# Patient Record
Sex: MALE | Race: BLACK OR AFRICAN AMERICAN
[De-identification: names, ages, dates, MRNs, and addresses within clinical notes are randomized per-mention and may not be internally consistent; named-entity substitution may affect disease eponyms.]

---

## 2017-06-17 NOTE — PHYS DOC
Past Medical History


Past Medical History:  Diabetes-Type I, Hypertension


Additional Past Medical Histor:  Pancreatitis, and Pancreatic Cancer


Past Surgical History:  Other


Additional Past Surgical Histo:  PANCREATECTOMY


Alcohol Use:  None


Drug Use:  None





Adult General


Chief Complaint


Chief Complaint:  BLOOD SUGAR PROBLEM





[facility]


HPI





Patient is a 72  year old male who presents with low blood sugar.  Pt ate fish 

and steamed vegetables tonight then took dose of Regular Insulin 8 units at 

approx 730pm  Then approx 30 min later pt was witnessed by wife to be very 

sweaty then confused and aggitated.  Called EMS on their arrival Accucheck was 

21.  Pt combative and they were unable to get IV access.  An IO was placed in L 

tibia and then gave glucagon and 1/2 amp D50.  Pt mentation and BS improved and 

transported to ER.  Pt denies any pain except at site where IO was placed.  Pt 

states when eats fish usually only takes 5 Units but per wife she witnessed him 

dosing self at 8 units.





Review of Systems


Review of Systems





Constitutional: Denies fever or chills []


Eyes: Denies change in visual acuity, redness, or eye pain []


HENT: Denies nasal congestion or sore throat []


Respiratory: Denies cough or shortness of breath []


Cardiovascular: No additional information not addressed in HPI []


GI: Denies abdominal pain, nausea, vomiting, bloody stools or diarrhea []


: Denies dysuria or hematuria []


Musculoskeletal: Denies back pain or joint pain []


Integument: Denies rash or skin lesions []


Neurologic: Denies headache, focal weakness or sensory changes []


Endocrine: Denies polyuria or polydipsia []





Allergies


Allergies





Allergies








Coded Allergies Type Severity Reaction Last Updated Verified


 


  Penicillins Allergy Intermediate Rash 6/16/17 Yes











Physical Exam


Physical Exam





Constitutional: Well developed, well nourished, no acute distress, non-toxic 

appearance. []


HENT: Normocephalic, atraumatic, bilateral external ears normal, oropharynx 

moist, no oral exudates, nose normal. []


Eyes: PERRLA, EOMI, conjunctiva normal, no discharge. [] 


Neck: Normal range of motion, no tenderness, supple, no stridor. [] 


Cardiovascular:Heart rate regular rhythm, no murmur []


Lungs & Thorax:  Bilateral breath sounds clear to auscultation []


Abdomen: Bowel sounds normal, soft, no tenderness, no masses, no pulsatile 

masses. [] 


Skin: Warm, dry, no erythema, no rash. [] 


Back: No tenderness, no CVA tenderness. [] 


Extremities: No tenderness, no cyanosis, no clubbing, ROM intact, no edema. [] 


Neurologic: Alert and oriented X 3, normal motor function, normal sensory 

function, no focal deficits noted. []


Psychologic: Affect normal, judgement normal, mood normal. []





Current Patient Data


Vital Signs





 Vital Signs








  Date Time  Temp Pulse Resp B/P (MAP) Pulse Ox O2 Delivery O2 Flow Rate FiO2


 


6/17/17 00:00  72  162/80 (107) 96 Room Air  


 


6/16/17 22:43 97.6  20     





 97.6       








Lab Values





 Laboratory Tests








Test


  6/16/17


23:15 6/16/17


23:45


 


White Blood Count


  14.2 x10^3/uL


(4.0-11.0)  H 


 


 


Red Blood Count


  4.04 x10^6/uL


(4.30-5.70)  L 


 


 


Hemoglobin


  12.1 g/dL


(13.0-17.5)  L 


 


 


Hematocrit


  37.9 %


(39.0-53.0)  L 


 


 


Mean Corpuscular Volume


  94 fL ()


  


 


 


Mean Corpuscular Hemoglobin 30 pg (25-35)   


 


Mean Corpuscular Hemoglobin


Concent 32 g/dL


(31-37) 


 


 


Red Cell Distribution Width


  14.6 %


(11.5-14.5)  H 


 


 


Platelet Count


  228 x10^3/uL


(140-400) 


 


 


Neutrophils (%) (Auto) 81 % (31-73)  H 


 


Lymphocytes (%) (Auto) 11 % (24-48)  L 


 


Monocytes (%) (Auto) 6 % (0-9)   


 


Eosinophils (%) (Auto) 1 % (0-3)   


 


Basophils (%) (Auto) 0 % (0-3)   


 


Neutrophils # (Auto)


  11.6 x10^3uL


(1.8-7.7)  H 


 


 


Lymphocytes # (Auto)


  1.6 x10^3/uL


(1.0-4.8) 


 


 


Monocytes # (Auto)


  0.8 x10^3/uL


(0.0-1.1) 


 


 


Eosinophils # (Auto)


  0.1 x10^3/uL


(0.0-0.7) 


 


 


Basophils # (Auto)


  0.0 x10^3/uL


(0.0-0.2) 


 


 


Sodium Level


  141 mmol/L


(136-145) 


 


 


Potassium Level


  4.2 mmol/L


(3.5-5.1) 


 


 


Chloride Level


  106 mmol/L


() 


 


 


Carbon Dioxide Level


  24 mmol/L


(21-32) 


 


 


Anion Gap 11 (6-14)   


 


Blood Urea Nitrogen


  26 mg/dL


(8-26) 


 


 


Creatinine


  1.9 mg/dL


(0.7-1.3)  H 


 


 


Estimated GFR


(Cockcroft-Gault) 42.5  


  


 


 


BUN/Creatinine Ratio 14 (6-20)   


 


Glucose Level


  217 mg/dL


(70-99)  H 


 


 


Calcium Level


  9.0 mg/dL


(8.5-10.1) 


 


 


Magnesium Level


  1.6 mg/dL


(1.8-2.4)  L 


 


 


Total Bilirubin


  1.0 mg/dL


(0.2-1.0) 


 


 


Aspartate Amino Transferase


(AST) 25 U/L (15-37)


  


 


 


Alanine Aminotransferase (ALT)


  26 U/L (16-63)


  


 


 


Alkaline Phosphatase


  83 U/L


() 


 


 


Creatine Kinase


  140 U/L


() 


 


 


Creatine Kinase MB (Mass)


  0.8 ng/mL


(0.0-3.6) 


 


 


Creatine Kinase MB Relative


Index 0.6 % (0-4)  


  


 


 


Troponin I Quantitative


  < 0.017 ng/mL


(0.000-0.055) 


 


 


Total Protein


  6.6 g/dL


(6.4-8.2) 


 


 


Albumin


  3.3 g/dL


(3.4-5.0)  L 


 


 


Albumin/Globulin Ratio 1.0 (1.0-1.7)   


 


Glucose (Fingerstick)


  


  161 mg/dL


(70-99)  H





 Laboratory Tests


6/16/17 23:15








 Laboratory Tests


6/16/17 23:15














EKG


EKG


[]





Radiology/Procedures


Radiology/Procedures


[]


Impressions:


HYPOGLYCEMIA EPISODE





Course & Med Decision Making


Course & Med Decision Making


Pertinent Labs and Imaging studies reviewed. (See chart for details)








Pt observed for several hours in ER with consistent BS above 100.  Pt no 

further symptoms.  Pt now 3+ hours from time of Regular Insulin.  Pt states has 

NOT taken his usual long acting insulin tonight.





Discussed with pt and wife need for close follow up BS and only take 1/2 dose 

of Levimer insulin tonight then follow up with PCP





Dale Disclaimer


Dragon Disclaimer


This electronic medical record was generated, in whole or in part, using a 

voice recognition dictation system.





Departure


Departure


Impression:  


 Primary Impression:  


 Hypoglycemia


Disposition:  01 HOME, SELF-CARE


Condition:  STABLE


Patient Instructions:  Hypoglycemia, Easy-to-Read





Additional Instructions:  


FOLLOW SUGARS CLOSE


USE 1/2 DOSE OF LONG ACTING INSULIN TONIGHT


FOLLOW UP WITH YOUR PRIMARY CARE PHYSICIAN MONDAY FOR RECHECK


RETURN IF WORSE OR CHANGE IN SYMPTOMS











LOBO COLE MD Jun 17, 2017 03:34

## 2017-06-17 NOTE — EKG
[facility]

              [address] [place] [address]

Test Date:    2017               Test Time:    22:38:18

Pat Name:     NICOLE RUBIN               Department:   

Patient ID:   [id number]           Room:          

Gender:       M                        Technician:   

:          1945               Requested By: LOBO COLE

Order Number: [id number]            Reading MD:   Gordy Scott

                                 Measurements

Intervals                              Axis          

Rate:         68                       P:            -56

CA:           144                      QRS:          24

QRSD:         88                       T:            65

QT:           380                                    

QTc:          409                                    

                           Interpretive Statements

SINUS RHYTHM

NONSPECIFIC ST-T WAVE CHANGES.

RI6.01

No previous ECG available for comparison



Electronically Signed On 2017 10:49:16 CDT by Gordy Scott

## 2018-08-16 ENCOUNTER — HOSPITAL ENCOUNTER (EMERGENCY)
Dept: HOSPITAL 61 - ER | Age: 73
Discharge: HOME | End: 2018-08-16
Payer: MEDICARE

## 2018-08-16 VITALS — BODY MASS INDEX: 25.76 KG/M2 | WEIGHT: 170 LBS | HEIGHT: 68 IN

## 2018-08-16 VITALS — SYSTOLIC BLOOD PRESSURE: 158 MMHG | DIASTOLIC BLOOD PRESSURE: 78 MMHG

## 2018-08-16 DIAGNOSIS — E10.649: Primary | ICD-10-CM

## 2018-08-16 DIAGNOSIS — I10: ICD-10-CM

## 2018-08-16 DIAGNOSIS — Z90.410: ICD-10-CM

## 2018-08-16 DIAGNOSIS — Z91.041: ICD-10-CM

## 2018-08-16 DIAGNOSIS — Z88.0: ICD-10-CM

## 2018-08-16 LAB
ALBUMIN SERPL-MCNC: 3.2 G/DL (ref 3.4–5)
ALBUMIN/GLOB SERPL: 0.9 {RATIO} (ref 1–1.7)
ALP SERPL-CCNC: 78 U/L (ref 46–116)
ALT SERPL-CCNC: 23 U/L (ref 16–63)
ANION GAP SERPL CALC-SCNC: 8 MMOL/L (ref 6–14)
AST SERPL-CCNC: 24 U/L (ref 15–37)
BASOPHILS # BLD AUTO: 0.1 X10^3/UL (ref 0–0.2)
BASOPHILS NFR BLD: 1 % (ref 0–3)
BILIRUB SERPL-MCNC: 0.9 MG/DL (ref 0.2–1)
BUN SERPL-MCNC: 26 MG/DL (ref 8–26)
BUN/CREAT SERPL: 14 (ref 6–20)
CALCIUM SERPL-MCNC: 8.5 MG/DL (ref 8.5–10.1)
CHLORIDE SERPL-SCNC: 103 MMOL/L (ref 98–107)
CK SERPL-CCNC: 150 U/L (ref 39–308)
CO2 SERPL-SCNC: 25 MMOL/L (ref 21–32)
CREAT SERPL-MCNC: 1.9 MG/DL (ref 0.7–1.3)
EOSINOPHIL NFR BLD: 0.1 X10^3/UL (ref 0–0.7)
EOSINOPHIL NFR BLD: 2 % (ref 0–3)
ERYTHROCYTE [DISTWIDTH] IN BLOOD BY AUTOMATED COUNT: 14.2 % (ref 11.5–14.5)
GFR SERPLBLD BASED ON 1.73 SQ M-ARVRAT: 42.4 ML/MIN
GLOBULIN SER-MCNC: 3.6 G/DL (ref 2.2–3.8)
GLUCOSE SERPL-MCNC: 162 MG/DL (ref 70–99)
HCT VFR BLD CALC: 36.1 % (ref 39–53)
HGB BLD-MCNC: 11.8 G/DL (ref 13–17.5)
LIPASE: 61 U/L (ref 73–393)
LYMPHOCYTES # BLD: 2.4 X10^3/UL (ref 1–4.8)
LYMPHOCYTES NFR BLD AUTO: 28 % (ref 24–48)
MAGNESIUM SERPL-MCNC: 1.9 MG/DL (ref 1.8–2.4)
MCH RBC QN AUTO: 32 PG (ref 25–35)
MCHC RBC AUTO-ENTMCNC: 33 G/DL (ref 31–37)
MCV RBC AUTO: 97 FL (ref 79–100)
MONO #: 1.2 X10^3/UL (ref 0–1.1)
MONOCYTES NFR BLD: 15 % (ref 0–9)
NEUT #: 4.5 X10^3UL (ref 1.8–7.7)
NEUTROPHILS NFR BLD AUTO: 55 % (ref 31–73)
PLATELET # BLD AUTO: 151 X10^3/UL (ref 140–400)
POTASSIUM SERPL-SCNC: 3.4 MMOL/L (ref 3.5–5.1)
PROT SERPL-MCNC: 6.8 G/DL (ref 6.4–8.2)
RBC # BLD AUTO: 3.74 X10^6/UL (ref 4.3–5.7)
SODIUM SERPL-SCNC: 136 MMOL/L (ref 136–145)
WBC # BLD AUTO: 8.3 X10^3/UL (ref 4–11)

## 2018-08-16 PROCEDURE — 83880 ASSAY OF NATRIURETIC PEPTIDE: CPT

## 2018-08-16 PROCEDURE — 82962 GLUCOSE BLOOD TEST: CPT

## 2018-08-16 PROCEDURE — 85025 COMPLETE CBC W/AUTO DIFF WBC: CPT

## 2018-08-16 PROCEDURE — 36415 COLL VENOUS BLD VENIPUNCTURE: CPT

## 2018-08-16 PROCEDURE — 80053 COMPREHEN METABOLIC PANEL: CPT

## 2018-08-16 PROCEDURE — 82553 CREATINE MB FRACTION: CPT

## 2018-08-16 PROCEDURE — 83735 ASSAY OF MAGNESIUM: CPT

## 2018-08-16 PROCEDURE — 84484 ASSAY OF TROPONIN QUANT: CPT

## 2018-08-16 PROCEDURE — 96361 HYDRATE IV INFUSION ADD-ON: CPT

## 2018-08-16 PROCEDURE — 93005 ELECTROCARDIOGRAM TRACING: CPT

## 2018-08-16 PROCEDURE — 96374 THER/PROPH/DIAG INJ IV PUSH: CPT

## 2018-08-16 PROCEDURE — 83690 ASSAY OF LIPASE: CPT

## 2018-08-16 PROCEDURE — 84443 ASSAY THYROID STIM HORMONE: CPT

## 2018-08-16 NOTE — PHYS DOC
Past Medical History


Past Medical History:  Cancer, Diabetes-Type I, Hypertension, Pancreatitis


Additional Past Medical Histor:  Pancreatitis, and Pancreatic Cancer


Past Surgical History:  Other


Additional Past Surgical Histo:  PANCREATECTOMY


Smoking:  Cigarettes (The patient is a nonsmoker.)


Alcohol Use:  Sober


Drug Use:  None





Adult General


HPI


HPI


Patient is a 73 year-old  male who lives at home with his wife, 

who presents to the emergency department for evaluation. The patient was 

sitting in a chair this evening, when the patient's wife noticed him becoming 

less responsive. She states that he has had episodes of hypoglycemia in the 

past and she tried to give him some oral glucose, but he slumped out of the 

chair. She states that he did not fall hard or injure himself, but he became 

unresponsive and diaphoretic and EMS was called. They found the patient with a 

blood glucose of 24. They were unable to obtain IV access, so they administered 

glucagon. The patient arrives in the emergency department and he is obtunded 

and lethargic but he is able to answer questions very slowly. He knows his name 

and his age. He denies any recent pain. He is noted to have a dilated right 

pupil but he had surgery earlier this week on his right eye. He denies any 

pain. IV access was obtained and the patient was administered dextrose in the 

emergency department, with improvement in his blood glucose to 200, and 

moderate improvement in his mental status. At the time of this dictation, 1:30 

AM, he is still somewhat somnolent. He denies any pain, but is moving all 

extremities. There are no alleviating or exacerbating factors to his symptoms. 

The patient states he takes both Humalog and Lantus. He did take his insulin 

this evening.





Review of Systems


Review of Systems





Constitutional: Denies fever or chills []


Eyes: Denies change in visual acuity, redness, or eye pain []


HENT: Denies nasal congestion or sore throat []


Respiratory: Denies cough or shortness of breath []


Cardiovascular: The patient denies any shortness of breath, chest pain, 

palpitations, or orthopnea []


GI: Denies abdominal pain, nausea, vomiting, bloody stools or diarrhea []


: Denies dysuria or hematuria []


Musculoskeletal: Denies back pain or joint pain []


Integument: Denies rash or skin lesions []


Neurologic: Denies headache, focal weakness or sensory changes. He was 

unresponsive upon EMS arrival but is more awake at this time if the blood 

glucose has been addressed.








All other systems were reviewed and found to be within normal limits, except as 

documented in this note.





Current Medications


Current Medications





Current Medications








 Medications


  (Trade)  Dose


 Ordered  Sig/Pham  Start Time


 Stop Time Status Last Admin


Dose Admin


 


 Dextrose


  (Dextrose


 50%-Water Syringe)  25 gm  1X  ONCE  8/16/18 02:00


 8/16/18 02:01 DC 8/16/18 01:20


25 GM


 


 Dextrose/Sodium


 Chloride  1,000 ml @ 


 125 mls/hr  1X  ONCE  8/16/18 02:00


 8/16/18 09:59  8/16/18 01:27


125 MLS/HR











Allergies


Allergies





Allergies








Coded Allergies Type Severity Reaction Last Updated Verified


 


  Penicillins Allergy Intermediate Rash 6/16/17 Yes


 


  I S O L A T I O N *CONTACT* Allergy Unknown  12/6/17 Yes











Physical Exam


Physical Exam


PHYSICAL EXAM:





CONSTITUTIONAL: Well developed, well nourished


HEAD: normocephalic, atraumatic


EENT: Right pupil is 7 mm, minimally reactive, the left pupil is 4 mm and 

reactive. EOMI. Conjunctivae normal color, sclerae non-icteric; moist mucous 

membranes.


NECK: Supple, non-tender; no meningismus.


LUNGS: Lungs CTA, breathing even and unlabored. Normal air movement.


HEART: Regular rate and rhythm, no murmur


CHEST: No deformity; non-tender


ABDOMEN: The abdomen is soft, and non-tender, no masses or bruits.


EXTREM: Normal ROM; no deformity, no calf tenderness. Normal pulses palpable in 

all extremities. There is no pedal edema.


SKIN: No rash; no diaphoresis


NEURO: Lethargic, but oriented to person and place.; CN's grossly intact; 

strength grossly intact without focal deficit. The patient is able to move all 

extremities symmetrically. He is able to follow commands.


BACK: No CVA TTP.





Current Patient Data


Vital Signs





 Vital Signs








  Date Time  Temp Pulse Resp B/P (MAP) Pulse Ox O2 Delivery O2 Flow Rate FiO2


 


8/16/18 01:15 98.1 57 16 157/83 (107) 100 Nasal Cannula 2.0 





 98.1       








Lab Values





 Laboratory Tests








Test


 8/16/18


01:28 8/16/18


01:30


 


Glucose (Fingerstick)


 219 mg/dL


(70-99)  H 





 


White Blood Count


 


 8.3 x10^3/uL


(4.0-11.0)


 


Red Blood Count


 


 3.74 x10^6/uL


(4.30-5.70)  L


 


Hemoglobin


 


 11.8 g/dL


(13.0-17.5)  L


 


Hematocrit


 


 36.1 %


(39.0-53.0)  L


 


Mean Corpuscular Volume


 


 97 fL ()





 


Mean Corpuscular Hemoglobin  32 pg (25-35)  


 


Mean Corpuscular Hemoglobin


Concent 


 33 g/dL


(31-37)


 


Red Cell Distribution Width


 


 14.2 %


(11.5-14.5)


 


Platelet Count


 


 151 x10^3/uL


(140-400)


 


Neutrophils (%) (Auto)  55 % (31-73)  


 


Lymphocytes (%) (Auto)  28 % (24-48)  


 


Monocytes (%) (Auto)  15 % (0-9)  H


 


Eosinophils (%) (Auto)  2 % (0-3)  


 


Basophils (%) (Auto)  1 % (0-3)  


 


Neutrophils # (Auto)


 


 4.5 x10^3uL


(1.8-7.7)


 


Lymphocytes # (Auto)


 


 2.4 x10^3/uL


(1.0-4.8)


 


Monocytes # (Auto)


 


 1.2 x10^3/uL


(0.0-1.1)  H


 


Eosinophils # (Auto)


 


 0.1 x10^3/uL


(0.0-0.7)


 


Basophils # (Auto)


 


 0.1 x10^3/uL


(0.0-0.2)


 


Sodium Level


 


 136 mmol/L


(136-145)


 


Potassium Level


 


 3.4 mmol/L


(3.5-5.1)  L


 


Chloride Level


 


 103 mmol/L


()


 


Carbon Dioxide Level


 


 25 mmol/L


(21-32)


 


Anion Gap  8 (6-14)  


 


Blood Urea Nitrogen


 


 26 mg/dL


(8-26)


 


Creatinine


 


 1.9 mg/dL


(0.7-1.3)  H


 


Estimated GFR


(Cockcroft-Gault) 


 42.4  





 


BUN/Creatinine Ratio  14 (6-20)  


 


Glucose Level


 


 162 mg/dL


(70-99)  H


 


Calcium Level


 


 8.5 mg/dL


(8.5-10.1)


 


Magnesium Level


 


 1.9 mg/dL


(1.8-2.4)


 


Total Bilirubin


 


 0.9 mg/dL


(0.2-1.0)


 


Aspartate Amino Transferase


(AST) 


 24 U/L (15-37)





 


Alanine Aminotransferase (ALT)


 


 23 U/L (16-63)





 


Alkaline Phosphatase


 


 78 U/L


()


 


Creatine Kinase


 


 150 U/L


()


 


Creatine Kinase MB (Mass)


 


 0.8 ng/mL


(0.0-3.6)


 


Creatine Kinase MB Relative


Index 


 0.5 % (0-4)  





 


Troponin I Quantitative


 


 < 0.017 ng/mL


(0.000-0.055)


 


NT-Pro-B-Type Natriuretic


Peptide 


 126 pg/mL


(0-124)  H


 


Total Protein


 


 6.8 g/dL


(6.4-8.2)


 


Albumin


 


 3.2 g/dL


(3.4-5.0)  L


 


Albumin/Globulin Ratio


 


 0.9 (1.0-1.7)


L


 


Lipase


 


 61 U/L


()  L


 


Thyroid Stimulating Hormone


(TSH) 


 0.914 uIU/mL


(0.358-3.74)





 Laboratory Tests


8/16/18 01:30








 Laboratory Tests


8/16/18 01:30











EKG


EKG


[Normal sinus rhythm at a rate of 55 beats for minute, normal axis, first-

degree AV block with otherwise normal intervals. There are no acute ischemic ST/

T changes.]





Radiology/Procedures


Radiology/Procedures


[]





Course & Med Decision Making


Course & Med Decision Making


Pertinent Lab studies reviewed. (See chart for details)


Patient's blood testing was drawn, after the dextrose was administered, as the 

initial IV placement for which dextrose was given did not return blood.








3:15 AM: The patient's condition has significantly improved. He is 

spontaneously awake ambulatory at his baseline and is without complaint at this 

time. I discussed the option of hospitalization with the patient given that he 

uses long-acting insulin but he states he wants to go home. He expressed 

understanding of the potential complications for repeated episodes of 

hypoglycemia and I instructed the patient to continue to make sure he eats 

throughout the morning. He states he has a follow-up appointment with his 

ophthalmologist coming up in the morning, at 9:15 AM, and he insists on going 

home. Return precautions were discussed in detail.





[CRITICAL CARE TIME:  45 Minutes, excluding any procedures and care of other 

patients.]





Dragon Disclaimer


Dragon Disclaimer


This electronic medical record was generated, in whole or in part, using a 

voice recognition dictation system.





Departure


Departure


Impression:  


 Primary Impression:  


 Hypoglycemia


Disposition:  01 HOME, SELF-CARE


Condition:  IMPROVED


Referrals:  


UNKNOWN PCP NAME (PCP)


Patient Instructions:  Hypoglycemia (Low Blood Sugar)











ROSA DIOP MD Aug 16, 2018 01:35

## 2018-08-16 NOTE — EKG
[facility]

              [address] [place] [address]

Test Date:    2018               Test Time:    02:09:22

Pat Name:     NICOLE RUBIN               Department:   

Patient ID:   [id number]           Room:          

Gender:       M                        Technician:   

:          1945               Requested By: ROSA DIOP

Order Number: [id number]           Reading MD:   Jeb Monroe MD

                                 Measurements

Intervals                              Axis          

Rate:         55                       P:            54

MT:           248                      QRS:          7

QRSD:         86                       T:            42

QT:           422                                    

QTc:          405                                    

                           Interpretive Statements

SINUS RHYTHM

PROLONGED MT INTERVAL



Electronically Signed On 2018 15:11:01 CDT by Jeb Monroe MD